# Patient Record
Sex: FEMALE | Race: WHITE
[De-identification: names, ages, dates, MRNs, and addresses within clinical notes are randomized per-mention and may not be internally consistent; named-entity substitution may affect disease eponyms.]

---

## 2021-09-04 ENCOUNTER — HOSPITAL ENCOUNTER (EMERGENCY)
Dept: HOSPITAL 54 - ER | Age: 21
Discharge: HOME | End: 2021-09-04
Payer: MEDICAID

## 2021-09-04 VITALS — DIASTOLIC BLOOD PRESSURE: 98 MMHG | SYSTOLIC BLOOD PRESSURE: 126 MMHG

## 2021-09-04 VITALS — HEIGHT: 70 IN | WEIGHT: 265 LBS | BODY MASS INDEX: 37.94 KG/M2

## 2021-09-04 DIAGNOSIS — M54.5: Primary | ICD-10-CM

## 2021-09-04 PROCEDURE — 96372 THER/PROPH/DIAG INJ SC/IM: CPT

## 2021-09-04 PROCEDURE — 99283 EMERGENCY DEPT VISIT LOW MDM: CPT

## 2021-09-04 NOTE — NUR
pt  given toradol 30 mg im   8/10  pain  Patient discharged to home in stable 
condition. Written and verbal after care instructions given. Patient verbalizes 
understanding of instruction.

## 2021-09-07 ENCOUNTER — HOSPITAL ENCOUNTER (EMERGENCY)
Dept: HOSPITAL 54 - ER | Age: 21
Discharge: HOME | End: 2021-09-07
Payer: MEDICAID

## 2021-09-07 VITALS — WEIGHT: 260 LBS | BODY MASS INDEX: 37.22 KG/M2 | HEIGHT: 70 IN

## 2021-09-07 VITALS — DIASTOLIC BLOOD PRESSURE: 99 MMHG | SYSTOLIC BLOOD PRESSURE: 145 MMHG

## 2021-09-07 DIAGNOSIS — T22.011A: Primary | ICD-10-CM
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
## 2021-09-07 DIAGNOSIS — Z71.1: ICD-10-CM
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
## 2021-09-07 DIAGNOSIS — Z79.899: ICD-10-CM
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
## 2022-01-31 ENCOUNTER — HOSPITAL ENCOUNTER (EMERGENCY)
Dept: HOSPITAL 54 - ER | Age: 22
Discharge: HOME | End: 2022-01-31
Payer: MEDICAID

## 2022-01-31 VITALS — BODY MASS INDEX: 37.8 KG/M2 | WEIGHT: 270 LBS | HEIGHT: 71 IN

## 2022-01-31 VITALS — DIASTOLIC BLOOD PRESSURE: 71 MMHG | SYSTOLIC BLOOD PRESSURE: 121 MMHG

## 2022-01-31 DIAGNOSIS — R07.89: ICD-10-CM

## 2022-01-31 DIAGNOSIS — R10.13: ICD-10-CM

## 2022-01-31 DIAGNOSIS — I10: ICD-10-CM

## 2022-01-31 DIAGNOSIS — B34.9: Primary | ICD-10-CM

## 2022-01-31 DIAGNOSIS — Z79.899: ICD-10-CM

## 2022-01-31 LAB
ALBUMIN SERPL BCP-MCNC: 3.4 G/DL (ref 3.4–5)
ALP SERPL-CCNC: 63 U/L (ref 46–116)
ALT SERPL W P-5'-P-CCNC: 26 U/L (ref 12–78)
AST SERPL W P-5'-P-CCNC: 16 U/L (ref 15–37)
BASOPHILS # BLD AUTO: 0 K/UL (ref 0–0.2)
BASOPHILS NFR BLD AUTO: 0.1 % (ref 0–2)
BILIRUB SERPL-MCNC: 0.6 MG/DL (ref 0.2–1)
BUN SERPL-MCNC: 10 MG/DL (ref 7–18)
CALCIUM SERPL-MCNC: 8.5 MG/DL (ref 8.5–10.1)
CHLORIDE SERPL-SCNC: 103 MMOL/L (ref 98–107)
CO2 SERPL-SCNC: 27 MMOL/L (ref 21–32)
CREAT SERPL-MCNC: 0.9 MG/DL (ref 0.6–1.3)
EOSINOPHIL NFR BLD AUTO: 2 % (ref 0–6)
GLUCOSE SERPL-MCNC: 94 MG/DL (ref 74–106)
HCT VFR BLD AUTO: 48 % (ref 33–45)
HGB BLD-MCNC: 15.9 G/DL (ref 11.5–14.8)
LYMPHOCYTES NFR BLD AUTO: 0.5 K/UL (ref 0.8–4.8)
LYMPHOCYTES NFR BLD AUTO: 7 % (ref 20–44)
MCHC RBC AUTO-ENTMCNC: 33 G/DL (ref 31–36)
MCV RBC AUTO: 98 FL (ref 82–100)
MONOCYTES NFR BLD AUTO: 0.5 K/UL (ref 0.1–1.3)
MONOCYTES NFR BLD AUTO: 7 % (ref 2–12)
NEUTROPHILS # BLD AUTO: 6.1 K/UL (ref 1.8–8.9)
NEUTROPHILS NFR BLD AUTO: 83.9 % (ref 43–81)
PLATELET # BLD AUTO: 335 K/UL (ref 150–450)
POTASSIUM SERPL-SCNC: 4.1 MMOL/L (ref 3.5–5.1)
PROT SERPL-MCNC: 7.3 G/DL (ref 6.4–8.2)
RBC # BLD AUTO: 4.84 MIL/UL (ref 4–5.2)
SODIUM SERPL-SCNC: 135 MMOL/L (ref 136–145)
WBC NRBC COR # BLD AUTO: 7.2 K/UL (ref 4.3–11)

## 2022-01-31 PROCEDURE — 71045 X-RAY EXAM CHEST 1 VIEW: CPT

## 2022-01-31 PROCEDURE — 71275 CT ANGIOGRAPHY CHEST: CPT

## 2022-01-31 PROCEDURE — 84703 CHORIONIC GONADOTROPIN ASSAY: CPT

## 2022-01-31 PROCEDURE — 96374 THER/PROPH/DIAG INJ IV PUSH: CPT

## 2022-01-31 PROCEDURE — 85025 COMPLETE CBC W/AUTO DIFF WBC: CPT

## 2022-01-31 PROCEDURE — 85378 FIBRIN DEGRADE SEMIQUANT: CPT

## 2022-01-31 PROCEDURE — 84484 ASSAY OF TROPONIN QUANT: CPT

## 2022-01-31 PROCEDURE — 36415 COLL VENOUS BLD VENIPUNCTURE: CPT

## 2022-01-31 PROCEDURE — 76705 ECHO EXAM OF ABDOMEN: CPT

## 2022-01-31 PROCEDURE — 93005 ELECTROCARDIOGRAM TRACING: CPT

## 2022-01-31 PROCEDURE — 80053 COMPREHEN METABOLIC PANEL: CPT

## 2022-01-31 PROCEDURE — 99285 EMERGENCY DEPT VISIT HI MDM: CPT

## 2022-01-31 NOTE — NUR
PT CAME TO ER C/O STERNAL CHEST PAIN ON/OFF RADIATING TO L ARM X 2 DAYS. HX OF 
MARFAN'S SYNDROME W MITRAL VALVE PROLAPSE. +NAUSEA, REPORTS VOMITING TODAY. 
ALSO C/O COUGH, SORE THROAT, GENERALIZED WEAKNESS X 4 DAYS. AAOX4, BREATHING 
EVEN AND UNLABORED. NO ACTIVE VOMITING. ON MONITOR. POX 96% ON RA. WILL 
CONTINUE TO MONITOR.

## 2022-01-31 NOTE — NUR
Patient discharged to home in stable condition. RX Written and verbal after 
care instructions given. Patient verbalizes understanding of instruction. PT 
ambulatory with a steady gait

## 2022-04-07 ENCOUNTER — HOSPITAL ENCOUNTER (EMERGENCY)
Dept: HOSPITAL 54 - ER | Age: 22
Discharge: HOME | End: 2022-04-07
Payer: MEDICAID

## 2022-04-07 VITALS — WEIGHT: 265 LBS | BODY MASS INDEX: 37.1 KG/M2 | HEIGHT: 71 IN

## 2022-04-07 VITALS — DIASTOLIC BLOOD PRESSURE: 84 MMHG | SYSTOLIC BLOOD PRESSURE: 130 MMHG

## 2022-04-07 DIAGNOSIS — Z79.899: ICD-10-CM

## 2022-04-07 DIAGNOSIS — R11.2: ICD-10-CM

## 2022-04-07 DIAGNOSIS — Z79.52: ICD-10-CM

## 2022-04-07 DIAGNOSIS — Z87.74: ICD-10-CM

## 2022-04-07 DIAGNOSIS — Z87.39: ICD-10-CM

## 2022-04-07 DIAGNOSIS — Z79.1: ICD-10-CM

## 2022-04-07 DIAGNOSIS — Z87.798: ICD-10-CM

## 2022-04-07 DIAGNOSIS — K21.9: Primary | ICD-10-CM

## 2022-04-07 PROCEDURE — 99283 EMERGENCY DEPT VISIT LOW MDM: CPT

## 2022-04-07 RX ADMIN — ALUMINUM HYDROXIDE, MAGNESIUM HYDROXIDE, AND SIMETHICONE ONE ML: 200; 200; 20 SUSPENSION ORAL at 00:42

## 2022-04-07 RX ADMIN — ONDANSETRON ONE MG: 4 TABLET, ORALLY DISINTEGRATING ORAL at 00:42

## 2022-04-07 RX ADMIN — LIDOCAINE HYDROCHLORIDE ONE ML: 20 SOLUTION ORAL; TOPICAL at 00:42

## 2022-04-07 NOTE — NUR
Patient discharged to home in stable condition. Written and verbal after care 
instructions given. Patient verbalizes understanding of instruction.pT 
ambulatory with a steady gait

## 2022-04-07 NOTE — NUR
BIBS C/O NAUSEA AND VOMITTING SINCE 2PM. PATIENT ALERT AND ORIENTED X3. 
AMBULATORY WITH NON LABORED BREATHING IN BED 10 AWAITING MD [id number].

## 2022-05-22 ENCOUNTER — HOSPITAL ENCOUNTER (EMERGENCY)
Dept: HOSPITAL 54 - ER | Age: 22
LOS: 1 days | Discharge: HOME | End: 2022-05-23
Payer: MEDICAID

## 2022-05-22 VITALS — HEIGHT: 71 IN | BODY MASS INDEX: 33.6 KG/M2 | WEIGHT: 240 LBS

## 2022-05-22 DIAGNOSIS — R07.89: Primary | ICD-10-CM

## 2022-05-22 DIAGNOSIS — Z79.899: ICD-10-CM

## 2022-05-22 DIAGNOSIS — Z87.39: ICD-10-CM

## 2022-05-22 LAB
BASOPHILS # BLD AUTO: 0 K/UL (ref 0–0.2)
BASOPHILS NFR BLD AUTO: 0.5 % (ref 0–2)
BUN SERPL-MCNC: 9 MG/DL (ref 7–18)
CALCIUM SERPL-MCNC: 8.6 MG/DL (ref 8.5–10.1)
CHLORIDE SERPL-SCNC: 108 MMOL/L (ref 98–107)
CO2 SERPL-SCNC: 28 MMOL/L (ref 21–32)
CREAT SERPL-MCNC: 1 MG/DL (ref 0.6–1.3)
EOSINOPHIL NFR BLD AUTO: 2.4 % (ref 0–6)
GLUCOSE SERPL-MCNC: 89 MG/DL (ref 74–106)
HCT VFR BLD AUTO: 44 % (ref 33–45)
HGB BLD-MCNC: 14.9 G/DL (ref 11.5–14.8)
LYMPHOCYTES NFR BLD AUTO: 2.3 K/UL (ref 0.8–4.8)
LYMPHOCYTES NFR BLD AUTO: 26 % (ref 20–44)
MCHC RBC AUTO-ENTMCNC: 34 G/DL (ref 31–36)
MCV RBC AUTO: 96 FL (ref 82–100)
MONOCYTES NFR BLD AUTO: 0.6 K/UL (ref 0.1–1.3)
MONOCYTES NFR BLD AUTO: 6.9 % (ref 2–12)
NEUTROPHILS # BLD AUTO: 5.7 K/UL (ref 1.8–8.9)
NEUTROPHILS NFR BLD AUTO: 64.2 % (ref 43–81)
PLATELET # BLD AUTO: 329 K/UL (ref 150–450)
POTASSIUM SERPL-SCNC: 3.4 MMOL/L (ref 3.5–5.1)
RBC # BLD AUTO: 4.53 MIL/UL (ref 4–5.2)
SODIUM SERPL-SCNC: 141 MMOL/L (ref 136–145)
WBC NRBC COR # BLD AUTO: 8.8 K/UL (ref 4.3–11)

## 2022-05-22 PROCEDURE — 71275 CT ANGIOGRAPHY CHEST: CPT

## 2022-05-22 PROCEDURE — 84484 ASSAY OF TROPONIN QUANT: CPT

## 2022-05-22 PROCEDURE — 84702 CHORIONIC GONADOTROPIN TEST: CPT

## 2022-05-22 PROCEDURE — 36415 COLL VENOUS BLD VENIPUNCTURE: CPT

## 2022-05-22 PROCEDURE — 99285 EMERGENCY DEPT VISIT HI MDM: CPT

## 2022-05-22 PROCEDURE — 93005 ELECTROCARDIOGRAM TRACING: CPT

## 2022-05-22 PROCEDURE — 85025 COMPLETE CBC W/AUTO DIFF WBC: CPT

## 2022-05-22 PROCEDURE — 80048 BASIC METABOLIC PNL TOTAL CA: CPT

## 2022-05-23 VITALS — SYSTOLIC BLOOD PRESSURE: 112 MMHG | DIASTOLIC BLOOD PRESSURE: 73 MMHG

## 2022-05-23 NOTE — NUR
Patient discharged to home in stable condition. Written and verbal after care 
instructions given. Patient verbalizes understanding of instruction. IV line 
removed and PT ambulatory with steady gait.